# Patient Record
Sex: MALE | Race: WHITE | NOT HISPANIC OR LATINO | Employment: OTHER | URBAN - METROPOLITAN AREA
[De-identification: names, ages, dates, MRNs, and addresses within clinical notes are randomized per-mention and may not be internally consistent; named-entity substitution may affect disease eponyms.]

---

## 2024-01-22 ENCOUNTER — OFFICE VISIT (OUTPATIENT)
Dept: PRIMARY CARE | Facility: CLINIC | Age: 74
End: 2024-01-22
Payer: MEDICARE

## 2024-01-22 VITALS
BODY MASS INDEX: 28.45 KG/M2 | SYSTOLIC BLOOD PRESSURE: 139 MMHG | WEIGHT: 228.8 LBS | HEART RATE: 69 BPM | OXYGEN SATURATION: 96 % | HEIGHT: 75 IN | DIASTOLIC BLOOD PRESSURE: 79 MMHG

## 2024-01-22 DIAGNOSIS — I25.10 ASHD (ARTERIOSCLEROTIC HEART DISEASE): Primary | Chronic | ICD-10-CM

## 2024-01-22 DIAGNOSIS — G47.33 OBSTRUCTIVE SLEEP APNEA SYNDROME: Chronic | ICD-10-CM

## 2024-01-22 DIAGNOSIS — R03.0 ELEVATED BP WITHOUT DIAGNOSIS OF HYPERTENSION: ICD-10-CM

## 2024-01-22 DIAGNOSIS — R79.89 OTHER SPECIFIED ABNORMAL FINDINGS OF BLOOD CHEMISTRY: ICD-10-CM

## 2024-01-22 DIAGNOSIS — K22.70 BARRETT'S ESOPHAGUS WITHOUT DYSPLASIA: Chronic | ICD-10-CM

## 2024-01-22 PROBLEM — E78.5 HYPERLIPIDEMIA: Status: ACTIVE | Noted: 2024-01-22

## 2024-01-22 PROBLEM — Z86.010 HISTORY OF COLON POLYPS: Status: ACTIVE | Noted: 2024-01-22

## 2024-01-22 PROBLEM — Z86.0100 HISTORY OF COLON POLYPS: Status: ACTIVE | Noted: 2024-01-22

## 2024-01-22 PROCEDURE — 1036F TOBACCO NON-USER: CPT | Performed by: STUDENT IN AN ORGANIZED HEALTH CARE EDUCATION/TRAINING PROGRAM

## 2024-01-22 PROCEDURE — 99214 OFFICE O/P EST MOD 30 MIN: CPT | Performed by: STUDENT IN AN ORGANIZED HEALTH CARE EDUCATION/TRAINING PROGRAM

## 2024-01-22 PROCEDURE — 1159F MED LIST DOCD IN RCRD: CPT | Performed by: STUDENT IN AN ORGANIZED HEALTH CARE EDUCATION/TRAINING PROGRAM

## 2024-01-22 PROCEDURE — 1160F RVW MEDS BY RX/DR IN RCRD: CPT | Performed by: STUDENT IN AN ORGANIZED HEALTH CARE EDUCATION/TRAINING PROGRAM

## 2024-01-22 PROCEDURE — 1157F ADVNC CARE PLAN IN RCRD: CPT | Performed by: STUDENT IN AN ORGANIZED HEALTH CARE EDUCATION/TRAINING PROGRAM

## 2024-01-22 RX ORDER — CALC/MAG/B COMPLEX/D3/HERB 61
15 TABLET ORAL 2 TIMES DAILY
COMMUNITY

## 2024-01-22 RX ORDER — UBIDECARENONE 30 MG
30 CAPSULE ORAL DAILY
COMMUNITY

## 2024-01-22 RX ORDER — CHOLECALCIFEROL (VITAMIN D3) 125 MCG
125 CAPSULE ORAL DAILY
COMMUNITY

## 2024-01-22 RX ORDER — GUAIFENESIN/PHENYLPROPANOLAMIN
500 EXPECTORANT ORAL 2 TIMES DAILY
COMMUNITY

## 2024-01-22 RX ORDER — AMPICILLIN TRIHYDRATE 250 MG
600 CAPSULE ORAL DAILY
COMMUNITY

## 2024-01-22 RX ORDER — ASPIRIN 81 MG/1
1 TABLET ORAL DAILY
COMMUNITY

## 2024-01-22 ASSESSMENT — ENCOUNTER SYMPTOMS
FATIGUE: 0
HEADACHES: 0
DIZZINESS: 0
PALPITATIONS: 0
APPETITE CHANGE: 0
CHEST TIGHTNESS: 0
SHORTNESS OF BREATH: 0
NERVOUS/ANXIOUS: 0
ACTIVITY CHANGE: 0
ARTHRALGIAS: 1

## 2024-01-22 NOTE — PROGRESS NOTES
"Patient Name:  Maury Reynolds  MRN:  [id number]  :  1950    Subjective   Patient ID: Maury Reynolds is a 73 y.o. male who presents for New Patient Visit.    HPI     72 yo male presents to [facility] as a new patient, he is former [facility] patient has been 2 years since last seen   He presents with his wifeDanielle     Bp is elevated in office  Denies any symptoms  Is not taking medications for this issue   He was taking medication for SVT- cartia which was stopped   Home pressures are 120s systolic    He declines pneumonia vaccine     Review of Systems   Constitutional:  Negative for activity change, appetite change and fatigue.   Eyes:  Negative for visual disturbance.   Respiratory:  Negative for chest tightness and shortness of breath.    Cardiovascular:  Negative for chest pain, palpitations and leg swelling.   Musculoskeletal:  Positive for arthralgias.   Neurological:  Negative for dizziness and headaches.   Psychiatric/Behavioral:  The patient is not nervous/anxious.      Objective   /79 (BP Location: Right arm, Patient Position: Sitting, BP Cuff Size: Large adult)   Pulse 69   Ht 1.905 m (6' 3\")   Wt 104 kg (228 lb 12.8 oz)   SpO2 96%   BMI 28.60 kg/m²     Physical Exam  Constitutional:       General: He is not in acute distress.     Appearance: Normal appearance. He is not ill-appearing.   HENT:      Head: Normocephalic and atraumatic.      Right Ear: Hearing normal.      Left Ear: Hearing normal.      Mouth/Throat:      Pharynx: No oropharyngeal exudate or posterior oropharyngeal erythema.   Eyes:      Extraocular Movements: Extraocular movements intact.   Cardiovascular:      Rate and Rhythm: Normal rate and regular rhythm.   Pulmonary:      Effort: Pulmonary effort is normal. No respiratory distress.   Abdominal:      Tenderness: There is no abdominal tenderness.   Musculoskeletal:      Cervical back: No tenderness.      Right lower leg: No edema.      Left lower leg: No edema.   Skin:     " General: Skin is warm and dry.   Neurological:      General: No focal deficit present.      Mental Status: He is alert and oriented to person, place, and time.   Psychiatric:         Mood and Affect: Mood normal.         Behavior: Behavior normal.     Assessment/Plan   Problem List Items Addressed This Visit             ICD-10-CM    Obstructive sleep apnea syndrome (Chronic) G47.33     Last sleep study 2019  He does not wear regularly   Settings for CPAP pressure 9mmHg  Strap does cause some irritation, has seen dermatology          ASHD (arteriosclerotic heart disease) - Primary (Chronic) I25.10     Patient had SE with multiple statins in the past and Reyna  Is taking fish oil and red yeast rice  Annual lipid panel recommended          Relevant Orders    Lipid Panel    Comprehensive metabolic panel    Hemoglobin A1c    CBC and Auto Differential    Follow Up In [facility] - PCP - Established    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

## 2024-01-22 NOTE — ASSESSMENT & PLAN NOTE
Last sleep study 2019  He does not wear regularly   Settings for CPAP pressure 9mmHg  Strap does cause some irritation, has seen dermatology

## 2024-01-22 NOTE — ASSESSMENT & PLAN NOTE
Last EGD in 2021  Asymptomatic on medication - lansoprazole  Follow with GI team  Due this year for repeat

## 2024-01-22 NOTE — ASSESSMENT & PLAN NOTE
Patient had SE with multiple statins in the past and Reyna  Is taking fish oil and red yeast rice  Annual lipid panel recommended

## 2024-01-22 NOTE — ASSESSMENT & PLAN NOTE
Home pressures WNL  Continue home log  Goal BP <130/80  Work on maintaining a healthy weight, monitoring sodium intake, consistent activity and exercise  Avoid chronic use of NSAIDs  Do not use sudafed for decongestant when ill

## 2024-01-25 ENCOUNTER — PATIENT MESSAGE (OUTPATIENT)
Dept: PRIMARY CARE | Facility: CLINIC | Age: 74
End: 2024-01-25
Payer: MEDICARE

## 2024-01-25 DIAGNOSIS — Z12.5 PROSTATE CANCER SCREENING: Primary | ICD-10-CM

## 2024-05-04 PROBLEM — N13.8 BPH WITH OBSTRUCTION/LOWER URINARY TRACT SYMPTOMS: Status: ACTIVE | Noted: 2018-06-18

## 2024-05-04 PROBLEM — M17.10 ARTHRITIS OF KNEE: Status: ACTIVE | Noted: 2024-05-04

## 2024-05-04 PROBLEM — R32 URINARY INCONTINENCE: Status: ACTIVE | Noted: 2018-06-18

## 2024-05-04 PROBLEM — R94.39 ABNORMAL STRESS TEST: Status: ACTIVE | Noted: 2024-05-04

## 2024-05-04 PROBLEM — N40.1 BPH WITH OBSTRUCTION/LOWER URINARY TRACT SYMPTOMS: Status: ACTIVE | Noted: 2018-06-18

## 2024-05-04 PROBLEM — R35.1 NOCTURIA: Status: ACTIVE | Noted: 2018-06-18

## 2024-05-04 PROBLEM — M54.50 ACUTE LOW BACK PAIN: Status: ACTIVE | Noted: 2024-05-04

## 2024-08-18 NOTE — PROGRESS NOTES
"Counseling:  The patient was counseled regarding diagnostic results, instructions for management, risk factor reductions, prognosis, patient and family education, impressions, risks and benefits of treatment options and importance of compliance with treatment.      Chief Complaint:   The patient presents today for annual followup of SVT and dyslipidemia.     History Of Present Illness:    Maury Reynolds is a 73 year old male patient who presents today for annual followup of SVT and dyslipidemia. 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 Over the past year, the patient states that he has done well from a cardiac standpoint. He denies any CP, chest discomfort, SOB or palpitations. BP has been stable. EKG today shows NSR with no acute changes. The patient is compliant with his prescribed medications.      Last Recorded Vitals:  Vitals:    08/19/24 0957   BP: 118/72   BP Location: Left arm   Pulse: 63   Weight: 98.9 kg (218 lb)   Height: 1.905 m (6' 3\")       Past Surgical History:  He has a past surgical history that includes Knee surgery (06/10/2016); Hand surgery (06/10/2016); Other surgical history (05/24/2022); Other surgical history (08/30/2022); Nasal septum surgery (09/12/2016); and Tonsillectomy (06/28/2016).      Social History:  He reports that he has never smoked. He has never used smokeless tobacco. He reports that he does not drink alcohol and does not use drugs.    Family History:  No family history on file.     Allergies:  Penicillins and Statins-hmg-coa reductase inhibitors    Outpatient Medications:  Current Outpatient Medications   Medication Instructions    alpha tocopherol (VITAMIN E) 400 Units, oral, Daily RT    aspirin 81 mg EC tablet 1 tablet, oral, Daily    cholecalciferol (VITAMIN D-3) 125 mcg, oral, Daily    co-enzyme Q-10 30 mg, oral, Daily    dilTIAZem CD (CARDIZEM CD) 180 mg, oral, Daily    fluticasone (Flonase) " 50 mcg/actuation nasal spray 1 spray, nasal, Daily PRN    GARLIC OIL ORAL oral    glucosamine HCl 750 mg tablet oral    krill-om-3-dha-epa-phospho-ast (MegaRed Omega-3 Krill Oil) 404-403-85-64 mg capsule oral    lansoprazole (PREVACID) 15 mg, oral, 2 times daily    multivitamin capsule oral    omega-3 fatty acids (FISH OIL CONCENTRATE ORAL) oral    red yeast rice 600 mg, oral, Daily, 2 tablets     saw palmetto 500 mg, oral, 2 times daily     Review of Systems   All other systems reviewed and are negative.     Physical Exam:  Constitutional:       Appearance: Healthy appearance. Not in distress.   Neck:      Vascular: No JVR. JVD normal.   Pulmonary:      Effort: Pulmonary effort is normal.      Breath sounds: Normal breath sounds. No wheezing. No rhonchi. No rales.   Chest:      Chest wall: Not tender to palpatation.   Cardiovascular:      PMI at left midclavicular line. Normal rate. Regular rhythm. Normal S1. Normal S2.       Murmurs: There is no murmur.      No gallop.  No click. No rub.   Pulses:     Intact distal pulses.   Edema:     Peripheral edema absent.   Abdominal:      General: Bowel sounds are normal.      Palpations: Abdomen is soft.      Tenderness: There is no abdominal tenderness.   Musculoskeletal: Normal range of motion.         General: No tenderness. Skin:     General: Skin is warm and dry.   Neurological:      General: No focal deficit present.      Mental Status: Alert and oriented to person, place and time.          Last Labs:  CBC -  Lab Results   Component Value Date    WBC 5.5 01/23/2023    HGB 15.1 01/23/2023    HCT 45.3 01/23/2023     01/23/2023     01/23/2023       CMP -  Lab Results   Component Value Date    CALCIUM 9.9 01/23/2023    PROT 6.8 12/07/2022    ALBUMIN 4.2 12/07/2022    AST 14 12/07/2022    ALT 10 12/07/2022    ALKPHOS 55 12/07/2022    BILITOT 0.5 12/07/2022       LIPID PANEL -   Lab Results   Component Value Date    CHOL 219 (H) 12/07/2022    TRIG 84  12/07/2022    HDL 43.8 12/07/2022    CHHDL 5.0 12/07/2022    LDLF 158 (H) 12/07/2022    VLDL 17 12/07/2022       RENAL FUNCTION PANEL -   Lab Results   Component Value Date    GLUCOSE 79 01/23/2023     01/23/2023    K 5.2 01/23/2023     01/23/2023    CO2 21 01/23/2023    ANIONGAP 21 (H) 01/23/2023    BUN 15 01/23/2023    CREATININE 0.96 01/23/2023    GFRMALE 85 01/23/2023    CALCIUM 9.9 01/23/2023    ALBUMIN 4.2 12/07/2022        Lab Results   Component Value Date    HGBA1C 5.2 12/09/2019       Last Cardiology Tests:  08/08/2023 - CT Calcium Score  1. Total 443.1.   2. The visualized mid/lower ascending thoracic aorta measures 3.5 cm in diameter. The aortic root measures 3.8 cm in axial diameter. Mild aortic calcification is noted. The heart is normal in size. No pericardial effusion is present.   3. No gross mediastinal or hilar lymphadenopathy or mass is identified. The visualized segments of the lungs are normally expanded and clear aside from minimal bilateral platelike atelectasis or scarring. A moderate size lateral basal right lower lobe pleural-based fibrotic band demonstrated in April 2019 is not included.   4. The visualized subdiaphragmatic structures appear normal. The  image shows multilevel spinal degenerative changes and slight thoracic dextroscoliosis.     01/27/2023 - Electrophysiology Procedure   AVNRT ablation.      12/11/2018 - Cardiac Catheterization (LH)  Moderate non-obstructive coronary artery disease.      11/08/2018 - Stress Test  1. Medium-sized area of partially reversible perfusion defect of the inferior segment, consistent with ischemia in right coronary artery territory. Normal left ventricular systolic function on post stress gated imaging.   2. Normal graded exercise stress test. Above-average exercise capacity. Hernandez treadmill score is 9.5 suggesting a low risk for mortality.     Assessment/Plan   1) SVT s/p AVNRT ablation 01/27/2023  Advised to monitor BP since  coming off Cardizem and to contact our office if SBP increases above 140   Denies CP, chest discomfort or SOB  Denies recurrent SVT since undergoing ablation   In NSR by EKG   F/U 1 year    2) Dyslipidemia  Intolerant of statins; debilitating muscle and joint pain   Lipid panel 12/07/2022 with total cholesterol, LDL and triglycerides of 219, 158 and 84 respectively  CT calcium score 08/08/2023 with total score of 443.1  Recommend starting PCSK9 inhibitor or Nexletol for management of dyslipidemia in light of elevated CT calcium score - patient and his wife will research medications and inform our office if they decide to pursue medical management.  Recommend having a repeat lipid panel drawn through his PCP  F/U 1 year      Scribe Attestation  By signing my name below, I, Nawaf Stevenson   attest that this documentation has been prepared under the direction and in the presence of Garry Coy MD.

## 2024-08-19 ENCOUNTER — APPOINTMENT (OUTPATIENT)
Dept: CARDIOLOGY | Facility: CLINIC | Age: 74
End: 2024-08-19
Payer: MEDICARE

## 2024-08-19 VITALS
WEIGHT: 218 LBS | SYSTOLIC BLOOD PRESSURE: 118 MMHG | BODY MASS INDEX: 27.1 KG/M2 | HEART RATE: 63 BPM | DIASTOLIC BLOOD PRESSURE: 72 MMHG | HEIGHT: 75 IN

## 2024-08-19 DIAGNOSIS — I25.10 ASHD (ARTERIOSCLEROTIC HEART DISEASE): Primary | Chronic | ICD-10-CM

## 2024-08-19 PROCEDURE — 1157F ADVNC CARE PLAN IN RCRD: CPT | Performed by: INTERNAL MEDICINE

## 2024-08-19 PROCEDURE — 93005 ELECTROCARDIOGRAM TRACING: CPT | Performed by: INTERNAL MEDICINE

## 2024-08-19 PROCEDURE — 1036F TOBACCO NON-USER: CPT | Performed by: INTERNAL MEDICINE

## 2024-08-19 PROCEDURE — 3008F BODY MASS INDEX DOCD: CPT | Performed by: INTERNAL MEDICINE

## 2024-08-19 PROCEDURE — 99213 OFFICE O/P EST LOW 20 MIN: CPT | Performed by: INTERNAL MEDICINE

## 2024-08-19 PROCEDURE — 1160F RVW MEDS BY RX/DR IN RCRD: CPT | Performed by: INTERNAL MEDICINE

## 2024-08-19 PROCEDURE — 93010 ELECTROCARDIOGRAM REPORT: CPT | Performed by: INTERNAL MEDICINE

## 2024-08-19 PROCEDURE — 1159F MED LIST DOCD IN RCRD: CPT | Performed by: INTERNAL MEDICINE

## 2024-08-19 ASSESSMENT — ENCOUNTER SYMPTOMS
LOSS OF SENSATION IN FEET: 0
OCCASIONAL FEELINGS OF UNSTEADINESS: 0
DEPRESSION: 0

## 2024-08-19 NOTE — PATIENT INSTRUCTIONS
Continue all current medications as prescribed.   Please request repeat blood work through your primary care provider to followup on your cholesterol.  Followup with Dr. Coy in 1 year, sooner should any issues or concerns arise before then.     If you have any questions or cardiac concerns, please call our office at [phone].

## 2024-09-27 ENCOUNTER — LAB (OUTPATIENT)
Dept: LAB | Facility: LAB | Age: 74
End: 2024-09-27
Payer: MEDICARE

## 2024-09-27 DIAGNOSIS — Z12.5 ENCOUNTER FOR SCREENING FOR MALIGNANT NEOPLASM OF PROSTATE: Primary | ICD-10-CM

## 2024-09-27 DIAGNOSIS — Z12.5 PROSTATE CANCER SCREENING: ICD-10-CM

## 2024-09-27 LAB — PSA SERPL-MCNC: 1.21 NG/ML

## 2024-09-27 PROCEDURE — G0103 PSA SCREENING: HCPCS

## 2024-12-12 ENCOUNTER — OFFICE VISIT (OUTPATIENT)
Dept: URGENT CARE | Age: 74
End: 2024-12-12
Payer: MEDICARE

## 2024-12-12 VITALS
DIASTOLIC BLOOD PRESSURE: 74 MMHG | HEART RATE: 88 BPM | OXYGEN SATURATION: 95 % | TEMPERATURE: 97.9 F | RESPIRATION RATE: 16 BRPM | SYSTOLIC BLOOD PRESSURE: 114 MMHG

## 2024-12-12 DIAGNOSIS — J18.9 PNEUMONIA DUE TO INFECTIOUS ORGANISM, UNSPECIFIED LATERALITY, UNSPECIFIED PART OF LUNG: Primary | ICD-10-CM

## 2024-12-12 PROCEDURE — 99214 OFFICE O/P EST MOD 30 MIN: CPT | Performed by: NURSE PRACTITIONER

## 2024-12-12 PROCEDURE — 1159F MED LIST DOCD IN RCRD: CPT | Performed by: NURSE PRACTITIONER

## 2024-12-12 PROCEDURE — 1160F RVW MEDS BY RX/DR IN RCRD: CPT | Performed by: NURSE PRACTITIONER

## 2024-12-12 PROCEDURE — 1157F ADVNC CARE PLAN IN RCRD: CPT | Performed by: NURSE PRACTITIONER

## 2024-12-12 RX ORDER — ALBUTEROL SULFATE 90 UG/1
2 INHALANT RESPIRATORY (INHALATION) EVERY 4 HOURS PRN
Qty: 6.7 G | Refills: 0 | Status: SHIPPED | OUTPATIENT
Start: 2024-12-12 | End: 2024-12-26

## 2024-12-12 RX ORDER — BENZONATATE 200 MG/1
200 CAPSULE ORAL 3 TIMES DAILY PRN
Qty: 30 CAPSULE | Refills: 0 | Status: SHIPPED | OUTPATIENT
Start: 2024-12-12 | End: 2024-12-22

## 2024-12-12 RX ORDER — AZITHROMYCIN 250 MG/1
TABLET, FILM COATED ORAL
Qty: 6 TABLET | Refills: 0 | Status: SHIPPED | OUTPATIENT
Start: 2024-12-12

## 2024-12-12 NOTE — PROGRESS NOTES
SUBJECTIVE:   Maury Reynolds is a 74 y.o. male who complains of congestion, productive cough, myalgias, and headache, wheezing for 7 days. He denies a history of fatigue and shortness of breath and denies a history of asthma. Patient denies smoke cigarettes.  Is concerned for pneumonia d/t grandchildren being treated for pneumonia and wife sick with productive cough     OBJECTIVE:  ENT:  General: Vitals noted, no distress, afebrile. Normal phonation, no stridor, no trismus  ENT: TMs clear effusion bilaterally, EACs unremarkable. Mastoids nontender. Posterior oropharynx without erythema, exudate, or swelling. Uvula is in the midline and non-edematous. No Justin's angina.  Neck: Supple. No meningismus through full range of motion. No lymphadenopathy.   Cardiac: Regular rate and rhythm, no murmur.  Lungs: Good aeration throughout. Rhonchi right posterior lower lobe  Abdomen: Soft, nontender, nonsurgical throughout. Normoactive bowel sounds.   Extremities: No peripheral edema  Skin: No rash  Neuro: No focal neurologic deficits. NIH score is 0.     ASSESSMENT:   pneumonia    PLAN:  Presumptively treating for pneumonia based on presentation and exam  Should take antibiotic and use albuterol inhaler  Symptom management  Adequate hydration, vicks vapo rub, vaporizer in bedroom  Follow up with PCP if not improving  Go to ER if worsening SOB, CP  Symptomatic therapy suggested: push fluids, rest, use vaporizer or mist prn, and return office visit prn if symptoms persist or worsen. Call or return to clinic prn if these symptoms worsen or fail to improve as anticipated.

## 2024-12-12 NOTE — PATIENT INSTRUCTIONS
Presumptively treating for pneumonia based on presentation and exam  Should take antibiotic and use albuterol inhaler  Symptom management  Adequate hydration, vicks vapo rub, vaporizer in bedroom  Follow up with PCP if not improving  Go to ER if worsening SOB, CP

## 2024-12-18 ENCOUNTER — PATIENT MESSAGE (OUTPATIENT)
Dept: PRIMARY CARE | Facility: CLINIC | Age: 74
End: 2024-12-18
Payer: MEDICARE

## 2025-01-10 ENCOUNTER — LAB (OUTPATIENT)
Dept: LAB | Facility: LAB | Age: 75
End: 2025-01-10
Payer: MEDICARE

## 2025-01-10 DIAGNOSIS — Z12.5 ENCOUNTER FOR SCREENING FOR MALIGNANT NEOPLASM OF PROSTATE: Primary | ICD-10-CM

## 2025-01-10 DIAGNOSIS — I25.10 ASHD (ARTERIOSCLEROTIC HEART DISEASE): Chronic | ICD-10-CM

## 2025-01-10 DIAGNOSIS — R79.89 OTHER SPECIFIED ABNORMAL FINDINGS OF BLOOD CHEMISTRY: ICD-10-CM

## 2025-01-10 LAB
ALBUMIN SERPL BCP-MCNC: 4.1 G/DL (ref 3.4–5)
ALP SERPL-CCNC: 57 U/L (ref 33–136)
ALT SERPL W P-5'-P-CCNC: 11 U/L (ref 10–52)
ANION GAP SERPL CALC-SCNC: 12 MMOL/L (ref 10–20)
AST SERPL W P-5'-P-CCNC: 14 U/L (ref 9–39)
BASOPHILS # BLD AUTO: 0.04 X10*3/UL (ref 0–0.1)
BASOPHILS NFR BLD AUTO: 1 %
BILIRUB SERPL-MCNC: 1.1 MG/DL (ref 0–1.2)
BUN SERPL-MCNC: 15 MG/DL (ref 6–23)
CALCIUM SERPL-MCNC: 9.5 MG/DL (ref 8.6–10.6)
CHLORIDE SERPL-SCNC: 107 MMOL/L (ref 98–107)
CHOLEST SERPL-MCNC: 223 MG/DL (ref 0–199)
CHOLESTEROL/HDL RATIO: 5.6
CO2 SERPL-SCNC: 27 MMOL/L (ref 21–32)
CREAT SERPL-MCNC: 0.92 MG/DL (ref 0.5–1.3)
EGFRCR SERPLBLD CKD-EPI 2021: 88 ML/MIN/1.73M*2
EOSINOPHIL # BLD AUTO: 0.16 X10*3/UL (ref 0–0.4)
EOSINOPHIL NFR BLD AUTO: 3.9 %
ERYTHROCYTE [DISTWIDTH] IN BLOOD BY AUTOMATED COUNT: 13.2 % (ref 11.5–14.5)
EST. AVERAGE GLUCOSE BLD GHB EST-MCNC: 103 MG/DL
GLUCOSE SERPL-MCNC: 89 MG/DL (ref 74–99)
HBA1C MFR BLD: 5.2 %
HCT VFR BLD AUTO: 42.9 % (ref 41–52)
HDLC SERPL-MCNC: 39.6 MG/DL
HGB BLD-MCNC: 14.3 G/DL (ref 13.5–17.5)
IMM GRANULOCYTES # BLD AUTO: 0.01 X10*3/UL (ref 0–0.5)
IMM GRANULOCYTES NFR BLD AUTO: 0.2 % (ref 0–0.9)
LDLC SERPL CALC-MCNC: 158 MG/DL
LYMPHOCYTES # BLD AUTO: 1.59 X10*3/UL (ref 0.8–3)
LYMPHOCYTES NFR BLD AUTO: 38.6 %
MCH RBC QN AUTO: 32.4 PG (ref 26–34)
MCHC RBC AUTO-ENTMCNC: 33.3 G/DL (ref 32–36)
MCV RBC AUTO: 97 FL (ref 80–100)
MONOCYTES # BLD AUTO: 0.46 X10*3/UL (ref 0.05–0.8)
MONOCYTES NFR BLD AUTO: 11.2 %
NEUTROPHILS # BLD AUTO: 1.86 X10*3/UL (ref 1.6–5.5)
NEUTROPHILS NFR BLD AUTO: 45.1 %
NON HDL CHOLESTEROL: 183 MG/DL (ref 0–149)
NRBC BLD-RTO: 0 /100 WBCS (ref 0–0)
PLATELET # BLD AUTO: 178 X10*3/UL (ref 150–450)
POTASSIUM SERPL-SCNC: 4.2 MMOL/L (ref 3.5–5.3)
PROT SERPL-MCNC: 6.6 G/DL (ref 6.4–8.2)
PSA SERPL-MCNC: 1.33 NG/ML
RBC # BLD AUTO: 4.41 X10*6/UL (ref 4.5–5.9)
SODIUM SERPL-SCNC: 142 MMOL/L (ref 136–145)
TRIGL SERPL-MCNC: 128 MG/DL (ref 0–149)
VLDL: 26 MG/DL (ref 0–40)
WBC # BLD AUTO: 4.1 X10*3/UL (ref 4.4–11.3)

## 2025-01-10 PROCEDURE — G0103 PSA SCREENING: HCPCS

## 2025-01-10 PROCEDURE — 83036 HEMOGLOBIN GLYCOSYLATED A1C: CPT

## 2025-01-10 PROCEDURE — 80061 LIPID PANEL: CPT

## 2025-01-10 PROCEDURE — 36415 COLL VENOUS BLD VENIPUNCTURE: CPT

## 2025-01-10 PROCEDURE — 80053 COMPREHEN METABOLIC PANEL: CPT

## 2025-01-10 PROCEDURE — 85025 COMPLETE CBC W/AUTO DIFF WBC: CPT

## 2025-01-22 ENCOUNTER — APPOINTMENT (OUTPATIENT)
Dept: PRIMARY CARE | Facility: CLINIC | Age: 75
End: 2025-01-22
Payer: MEDICARE

## 2025-01-22 VITALS
DIASTOLIC BLOOD PRESSURE: 70 MMHG | OXYGEN SATURATION: 95 % | WEIGHT: 227 LBS | SYSTOLIC BLOOD PRESSURE: 108 MMHG | BODY MASS INDEX: 28.23 KG/M2 | HEIGHT: 75 IN | HEART RATE: 76 BPM

## 2025-01-22 DIAGNOSIS — Z00.00 MEDICARE ANNUAL WELLNESS VISIT, SUBSEQUENT: Primary | ICD-10-CM

## 2025-01-22 DIAGNOSIS — K22.70 BARRETT'S ESOPHAGUS WITHOUT DYSPLASIA: Chronic | ICD-10-CM

## 2025-01-22 DIAGNOSIS — I25.10 ASHD (ARTERIOSCLEROTIC HEART DISEASE): Chronic | ICD-10-CM

## 2025-01-22 PROCEDURE — 1123F ACP DISCUSS/DSCN MKR DOCD: CPT | Performed by: STUDENT IN AN ORGANIZED HEALTH CARE EDUCATION/TRAINING PROGRAM

## 2025-01-22 PROCEDURE — 1170F FXNL STATUS ASSESSED: CPT | Performed by: STUDENT IN AN ORGANIZED HEALTH CARE EDUCATION/TRAINING PROGRAM

## 2025-01-22 PROCEDURE — 1158F ADVNC CARE PLAN TLK DOCD: CPT | Performed by: STUDENT IN AN ORGANIZED HEALTH CARE EDUCATION/TRAINING PROGRAM

## 2025-01-22 PROCEDURE — 1036F TOBACCO NON-USER: CPT | Performed by: STUDENT IN AN ORGANIZED HEALTH CARE EDUCATION/TRAINING PROGRAM

## 2025-01-22 PROCEDURE — 1159F MED LIST DOCD IN RCRD: CPT | Performed by: STUDENT IN AN ORGANIZED HEALTH CARE EDUCATION/TRAINING PROGRAM

## 2025-01-22 PROCEDURE — 1157F ADVNC CARE PLAN IN RCRD: CPT | Performed by: STUDENT IN AN ORGANIZED HEALTH CARE EDUCATION/TRAINING PROGRAM

## 2025-01-22 PROCEDURE — 99214 OFFICE O/P EST MOD 30 MIN: CPT | Performed by: STUDENT IN AN ORGANIZED HEALTH CARE EDUCATION/TRAINING PROGRAM

## 2025-01-22 PROCEDURE — G0439 PPPS, SUBSEQ VISIT: HCPCS | Performed by: STUDENT IN AN ORGANIZED HEALTH CARE EDUCATION/TRAINING PROGRAM

## 2025-01-22 PROCEDURE — 3008F BODY MASS INDEX DOCD: CPT | Performed by: STUDENT IN AN ORGANIZED HEALTH CARE EDUCATION/TRAINING PROGRAM

## 2025-01-22 PROCEDURE — 1160F RVW MEDS BY RX/DR IN RCRD: CPT | Performed by: STUDENT IN AN ORGANIZED HEALTH CARE EDUCATION/TRAINING PROGRAM

## 2025-01-22 RX ORDER — SILDENAFIL 50 MG/1
50 TABLET, FILM COATED ORAL AS NEEDED
COMMUNITY

## 2025-01-22 ASSESSMENT — ENCOUNTER SYMPTOMS
OCCASIONAL FEELINGS OF UNSTEADINESS: 0
CONFUSION: 0
FACIAL ASYMMETRY: 0
DEPRESSION: 0
FEVER: 0
SHORTNESS OF BREATH: 0
ACTIVITY CHANGE: 0
LOSS OF SENSATION IN FEET: 0

## 2025-01-22 ASSESSMENT — ACTIVITIES OF DAILY LIVING (ADL)
TAKING_MEDICATION: INDEPENDENT
DOING_HOUSEWORK: INDEPENDENT
BATHING: INDEPENDENT
DRESSING: INDEPENDENT
MANAGING_FINANCES: INDEPENDENT
GROCERY_SHOPPING: INDEPENDENT

## 2025-01-22 ASSESSMENT — PATIENT HEALTH QUESTIONNAIRE - PHQ9
2. FEELING DOWN, DEPRESSED OR HOPELESS: NOT AT ALL
1. LITTLE INTEREST OR PLEASURE IN DOING THINGS: NOT AT ALL
SUM OF ALL RESPONSES TO PHQ9 QUESTIONS 1 AND 2: 0

## 2025-01-22 NOTE — ASSESSMENT & PLAN NOTE
Patient had SE with multiple statins in the past and Reyna  Is taking fish oil and red yeast rice  Annual lipid panel recommended   Recommend trial of PCSK9 inhibitor or Nexletol   Orders:    Follow Up In [facility] - PCP - Established

## 2025-01-22 NOTE — ASSESSMENT & PLAN NOTE
PNEUMONIA vaccine- Declines   SHINGLES vaccine- Completed  INFLUENZA vaccine- Declines     Screening tests:  Colon cancer screening--> Due 2027, on 5 year plan  Prostate Cancer Screening--> UTD WNL    During the course of the visit the patient was educated and counseled about age appropriate screening and preventive services. Completed preventive screenings were documented in the chart and orders were placed for outstanding screenings/procedures as documented in the Assessment and Plan.    Patient Instructions (the written plan) was given to the patient at check out that include any community based lifestyle interventions.    Other risk factors and conditions for which interventions are recommended are addressed as the other tagged diagnoses in this encounter.   Orders:    Follow Up In [facility] - PCP - Established; Future

## 2025-01-22 NOTE — ASSESSMENT & PLAN NOTE
Asymptomatic on medication - lansoprazole  Follow with GI team   Recheck in 3-5 years, plan for 2027
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
## 2025-03-04 ENCOUNTER — TELEPHONE (OUTPATIENT)
Dept: PRIMARY CARE | Facility: CLINIC | Age: 75
End: 2025-03-04
Payer: MEDICARE

## 2025-03-04 NOTE — TELEPHONE ENCOUNTER
Maury was seen 1-22-25 for medicare wellness  Received EOB -medicare wellness is zero balance , establish patient moderate level balance of 120.00    Wife is questioning what 120.00 was for ?

## 2025-03-05 NOTE — TELEPHONE ENCOUNTER
Patient was seen for Medicare Wellness Visit and also an established patient visit [id number]. It looks like they discussed medications and she ordered some blood work to check on the patients Heart Disease and Garcia's Esophagus.

## 2025-07-30 ENCOUNTER — TELEPHONE (OUTPATIENT)
Facility: CLINIC | Age: 75
End: 2025-07-30
Payer: MEDICARE

## 2025-07-30 DIAGNOSIS — Z12.11 COLON CANCER SCREENING: Primary | ICD-10-CM

## 2025-07-30 NOTE — TELEPHONE ENCOUNTER
----- Message from Ayesha AGUERO sent at 7/30/2025  8:15 AM EDT -----  Regarding: recall  Per chart recall, pt due for repeat colonoscopy  - can you place the order for the screening colonoscopy and send to [facility] .  Thank you.    #Chart reviewed for age, BMI and anticoagulants.      Last colon 2022 dhruv - repeat 3-5 years, is pt due?  
Order placed.   
pcp

## 2025-08-19 ENCOUNTER — APPOINTMENT (OUTPATIENT)
Dept: CARDIOLOGY | Facility: HOSPITAL | Age: 75
End: 2025-08-19
Payer: MEDICARE

## 2026-01-22 ENCOUNTER — APPOINTMENT (OUTPATIENT)
Dept: PRIMARY CARE | Facility: CLINIC | Age: 76
End: 2026-01-22
Payer: MEDICARE